# Patient Record
Sex: MALE | Race: WHITE | Employment: FULL TIME | URBAN - NONMETROPOLITAN AREA
[De-identification: names, ages, dates, MRNs, and addresses within clinical notes are randomized per-mention and may not be internally consistent; named-entity substitution may affect disease eponyms.]

---

## 2023-01-03 ENCOUNTER — HOSPITAL ENCOUNTER (EMERGENCY)
Age: 23
Discharge: HOME | End: 2023-01-03
Payer: COMMERCIAL

## 2023-01-03 VITALS
DIASTOLIC BLOOD PRESSURE: 90 MMHG | WEIGHT: 222 LBS | TEMPERATURE: 98.3 F | RESPIRATION RATE: 14 BRPM | SYSTOLIC BLOOD PRESSURE: 180 MMHG | OXYGEN SATURATION: 97 % | HEART RATE: 87 BPM

## 2023-01-03 DIAGNOSIS — J00 ACUTE NASOPHARYNGITIS: Primary | ICD-10-CM

## 2023-01-03 DIAGNOSIS — K13.79 ORAL MUCOCELE: ICD-10-CM

## 2023-01-03 LAB — GROUP A STREP CULTURE, REFLEX: NEGATIVE

## 2023-01-03 PROCEDURE — 99203 OFFICE O/P NEW LOW 30 MIN: CPT

## 2023-01-03 PROCEDURE — 87070 CULTURE OTHR SPECIMN AEROBIC: CPT

## 2023-01-03 PROCEDURE — 87880 STREP A ASSAY W/OPTIC: CPT

## 2023-01-03 PROCEDURE — 99213 OFFICE O/P EST LOW 20 MIN: CPT | Performed by: NURSE PRACTITIONER

## 2023-01-03 RX ORDER — LORATADINE 10 MG
1 CAPSULE ORAL 2 TIMES DAILY PRN
Qty: 60 CAPSULE | Refills: 0 | Status: SHIPPED | OUTPATIENT
Start: 2023-01-03

## 2023-01-03 RX ORDER — FLUTICASONE PROPIONATE 50 MCG
2 SPRAY, SUSPENSION (ML) NASAL DAILY
Qty: 32 G | Refills: 0 | Status: SHIPPED | OUTPATIENT
Start: 2023-01-03

## 2023-01-03 ASSESSMENT — ENCOUNTER SYMPTOMS
RHINORRHEA: 1
SORE THROAT: 1
SHORTNESS OF BREATH: 0
NAUSEA: 0
COUGH: 1

## 2023-01-03 ASSESSMENT — PAIN SCALES - GENERAL: PAINLEVEL_OUTOF10: 2

## 2023-01-03 ASSESSMENT — PAIN DESCRIPTION - LOCATION: LOCATION: THROAT

## 2023-01-03 ASSESSMENT — PAIN - FUNCTIONAL ASSESSMENT: PAIN_FUNCTIONAL_ASSESSMENT: 0-10

## 2023-01-03 NOTE — ED PROVIDER NOTES
[facility]       CHIEF COMPLAINT       Chief Complaint   Patient presents with    Pharyngitis       Nurses Notes reviewed and I agree except as noted in the HPI. HISTORY OF PRESENT ILLNESS   Darien Castorena is a 23 y.o. male who presents with complaints of a sore throat, congestion, runny nose. His symptoms started 3 days ago. He has not tried anything for treatment. Unsure of anything that makes it better or worse. Admits that his cough is bothersome as well. Denies any known fever. Denies any known exposure to illness. In addition, patient is concerned with a bump on the inside of his lip that he believes developed after he had a cold sore 3 weeks ago. He has not tried any treatment. The history is provided by the patient and a friend. REVIEW OF SYSTEMS     Review of Systems   Constitutional:  Negative for chills and fever. HENT:  Positive for congestion, mouth sores (lower lip), rhinorrhea and sore throat. Respiratory:  Positive for cough. Negative for shortness of breath. Cardiovascular:  Negative for chest pain. Gastrointestinal:  Negative for nausea. Musculoskeletal:  Negative for myalgias. Neurological:  Negative for headaches. PAST MEDICAL HISTORY   History reviewed. No pertinent past medical history. SURGICALHISTORY     Patient  has no past surgical history on file. CURRENT MEDICATIONS       Discharge Medication List as of 1/3/2023 12:41 PM          ALLERGIES     Patient is has No Known Allergies. Patients   There is no immunization history on file for this patient. FAMILY HISTORY     Patient's family history is not on file. SOCIAL HISTORY     Patient  reports that he has never smoked. He does not have any smokeless tobacco history on file. He reports current alcohol use. He reports current drug use. Drug: Marijuana [place]).     PHYSICAL EXAM     ED TRIAGE VITALS  BP: (!) 180/90, Temp: 98.3 °F (36.8 °C), Heart Rate: 87, Resp: 14, SpO2: 97 %,There is no height or weight on file to calculate BMI.,No LMP for male patient. Physical Exam  Vitals and nursing note reviewed. Constitutional:       General: He is not in acute distress. Appearance: He is well-developed. HENT:      Right Ear: Tympanic membrane normal.      Left Ear: Tympanic membrane normal.      Nose: Congestion present. Mouth/Throat:      Mouth: Mucous membranes are moist.     Cardiovascular:      Rate and Rhythm: Normal rate and regular rhythm. Heart sounds: Normal heart sounds. Pulmonary:      Effort: Pulmonary effort is normal.   Lymphadenopathy:      Cervical: No cervical adenopathy. Skin:     General: Skin is warm and dry. Neurological:      Mental Status: He is alert and oriented to person, place, and time. DIAGNOSTIC RESULTS     Labs:  Results for orders placed or performed during the hospital encounter of 01/03/23   STREP A ANTIGEN   Result Value Ref Range    GROUP A STREP CULTURE, REFLEX Negative        IMAGING:  None    EKG:  None    URGENT CARE COURSE:     Vitals:    01/03/23 1213   BP: (!) 180/90   Pulse: 87   Resp: 14   Temp: 98.3 °F (36.8 °C)   TempSrc: Temporal   SpO2: 97%   Weight: 222 lb (100.7 kg)       Medications - No data to display       PROCEDURES:  None    FINAL IMPRESSION      1. Acute nasopharyngitis    2. Oral mucocele      DISPOSITION/ PLAN   DISPOSITION Decision To Discharge 01/03/2023 12:34:20 PM     Will start patient on Coricidin HBP and Flonase. Advised he may take over-the-counter antipyretics if needed. Recommended gargling with salt water. Follow-up with family medicine residency clinic next week. PATIENT REFERRED TO:  No primary care provider on file. No primary physician on file.       DISCHARGE MEDICATIONS:  Discharge Medication List as of 1/3/2023 12:41 PM        START taking these medications    Details   Dextromethorphan-guaiFENesin (CORICIDIN HBP CONGESTION/COUGH)  MG CAPS Take 1 capsule by mouth 2 times daily as needed (cough/congestion), Disp-60 capsule, R-0Normal      fluticasone (FLONASE) 50 MCG/ACT nasal spray 2 sprays by Each Nostril route daily, Disp-32 g, R-0Normal             Discharge Medication List as of 1/3/2023 12:41 PM          Discharge Medication List as of 1/3/2023 12:41 PM          CARLITO Steiner CNP    (Please note that portions of this note were completed with a voice recognition program. Efforts were made to edit the dictations but occasionally words are mis-transcribed.)           CARLITO Steiner CNP  01/03/23 [id number]

## 2023-01-03 NOTE — Clinical Note
Rachele Whitman was seen and treated in our emergency department on 1/3/2023. He may return to work on 01/04/2023. If you have any questions or concerns, please don't hesitate to call.       CRALITO Cheatham - CNP

## 2023-01-03 NOTE — ED TRIAGE NOTES
Augusto Lopez arrives to room with complaint of  sore throat, vomiting, chest congestion  non-productive cough. symptoms started 2 days ago.        Work note

## 2023-01-06 LAB — THROAT/NOSE CULTURE: NORMAL

## 2024-08-05 ENCOUNTER — HOSPITAL ENCOUNTER (EMERGENCY)
Age: 24
Discharge: HOME | End: 2024-08-05
Payer: COMMERCIAL

## 2024-08-05 VITALS
HEART RATE: 65 BPM | RESPIRATION RATE: 18 BRPM | BODY MASS INDEX: 28.85 KG/M2 | SYSTOLIC BLOOD PRESSURE: 145 MMHG | DIASTOLIC BLOOD PRESSURE: 82 MMHG | WEIGHT: 213 LBS | TEMPERATURE: 97.7 F | HEIGHT: 72 IN | OXYGEN SATURATION: 98 %

## 2024-08-05 DIAGNOSIS — K21.9 GASTROESOPHAGEAL REFLUX DISEASE WITHOUT ESOPHAGITIS: Primary | ICD-10-CM

## 2024-08-05 PROCEDURE — 99213 OFFICE O/P EST LOW 20 MIN: CPT

## 2024-08-05 RX ORDER — PANTOPRAZOLE SODIUM 40 MG/1
40 TABLET, DELAYED RELEASE ORAL
Qty: 30 TABLET | Refills: 0 | Status: SHIPPED | OUTPATIENT
Start: 2024-08-05 | End: 2024-09-04

## 2024-08-05 RX ORDER — OMEPRAZOLE 10 MG/1
10 CAPSULE, DELAYED RELEASE ORAL DAILY
COMMUNITY
End: 2024-08-05

## 2024-08-05 ASSESSMENT — ENCOUNTER SYMPTOMS
DIARRHEA: 0
EYE DISCHARGE: 0
VOMITING: 0
TROUBLE SWALLOWING: 0
NAUSEA: 0
EYE REDNESS: 0
ABDOMINAL PAIN: 1
SHORTNESS OF BREATH: 0
SORE THROAT: 0
COUGH: 0
RHINORRHEA: 0

## 2024-08-05 ASSESSMENT — PAIN DESCRIPTION - DESCRIPTORS: DESCRIPTORS: PRESSURE

## 2024-08-05 ASSESSMENT — PAIN SCALES - GENERAL: PAINLEVEL_OUTOF10: 2

## 2024-08-05 ASSESSMENT — PAIN DESCRIPTION - ORIENTATION: ORIENTATION: MID

## 2024-08-05 ASSESSMENT — PAIN - FUNCTIONAL ASSESSMENT: PAIN_FUNCTIONAL_ASSESSMENT: 0-10

## 2024-08-05 ASSESSMENT — PAIN DESCRIPTION - LOCATION: LOCATION: ABDOMEN

## 2024-08-05 NOTE — ED PROVIDER NOTES
[facility] URGENT [facility]  Urgent Care Encounter      CHIEF COMPLAINT       Chief Complaint   Patient presents with    Abdominal Pain     mid       Nurses Notes reviewed and I agree except as noted in the HPI.  HISTORY OF PRESENT ILLNESS   Luis Antonio Jack is a 23 y.o. male who presents urgent care for evaluation of abdominal pain.  Reports he has suffered with acid reflux for the past 10 years.  Reports he takes omeprazole in the morning daily.  Reports though in the mornings he has noticed that increase in acid and he can feel it in his chest.  Mentions he feels like he just has a pit in his stomach.  Does endorse and a sore throat in the mornings.  Symptoms seem to subside during the day, and seem more aggravated at night.  Denies any nausea vomiting, blood in stool or vomiting blood.    REVIEW OF SYSTEMS     Review of Systems   Constitutional:  Negative for chills, diaphoresis, fatigue and fever.   HENT:  Negative for congestion, ear pain, rhinorrhea, sore throat and trouble swallowing.    Eyes:  Negative for discharge and redness.   Respiratory:  Negative for cough and shortness of breath.    Cardiovascular:  Negative for chest pain.   Gastrointestinal:  Positive for abdominal pain. Negative for diarrhea, nausea and vomiting.   Genitourinary:  Negative for decreased urine volume.   Musculoskeletal:  Negative for neck pain and neck stiffness.   Skin:  Negative for rash.   Neurological:  Negative for headaches.   Hematological:  Negative for adenopathy.   Psychiatric/Behavioral:  Negative for sleep disturbance.        PAST MEDICAL HISTORY   History reviewed. No pertinent past medical history.    SURGICAL HISTORY     Patient  has no past surgical history on file.    CURRENT MEDICATIONS       Discharge Medication List as of 8/5/2024  2:51 PM        CONTINUE these medications which have NOT CHANGED    Details   Dextromethorphan-guaiFENesin (CORICIDIN HBP CONGESTION/COUGH)  MG CAPS Take 1 capsule

## 2024-08-05 NOTE — ED NOTES
Pt with complaints of mid abdominal pain that started today. Denies eating questionable food or eating greasy and spicy foods. States bowel movements and urinary habits are normal.     Nadia Santos LPN  08/05/24 [id number]

## 2024-08-05 NOTE — DISCHARGE INSTRUCTIONS
Prescribed pantoprazole 40 mg daily.  Take this medication on an empty stomach prior to breakfast.  Stop taking omeprazole at this time.  After completion of pantoprazole 40 mg daily for 30 days may return to omeprazole 20 mg once daily.     Recommend avoiding triggers such as tomato-based products, spicy foods, highly spiced foods, caffeine, carbonation, and chocolate.    Follow-up with your primary care provider or [facility] in 7 to 10 days if symptoms worsen or fail to improve.

## 2025-04-09 ENCOUNTER — HOSPITAL ENCOUNTER (OUTPATIENT)
Age: 25
Discharge: HOME | End: 2025-04-09

## 2025-04-13 LAB — LEAD, INDUSTRIAL: NORMAL
